# Patient Record
Sex: FEMALE
[De-identification: names, ages, dates, MRNs, and addresses within clinical notes are randomized per-mention and may not be internally consistent; named-entity substitution may affect disease eponyms.]

---

## 2023-12-12 ENCOUNTER — APPOINTMENT (OUTPATIENT)
Dept: RHEUMATOLOGY | Facility: CLINIC | Age: 53
End: 2023-12-12

## 2023-12-14 ENCOUNTER — APPOINTMENT (OUTPATIENT)
Dept: RHEUMATOLOGY | Facility: CLINIC | Age: 53
End: 2023-12-14

## 2023-12-14 PROBLEM — Z00.00 ENCOUNTER FOR PREVENTIVE HEALTH EXAMINATION: Status: ACTIVE | Noted: 2023-12-14

## 2024-09-30 ENCOUNTER — TRANSCRIPTION ENCOUNTER (OUTPATIENT)
Age: 54
End: 2024-09-30

## 2024-10-03 ENCOUNTER — APPOINTMENT (OUTPATIENT)
Dept: RHEUMATOLOGY | Facility: CLINIC | Age: 54
End: 2024-10-03
Payer: MEDICAID

## 2024-10-03 ENCOUNTER — LABORATORY RESULT (OUTPATIENT)
Age: 54
End: 2024-10-03

## 2024-10-03 VITALS
OXYGEN SATURATION: 100 % | SYSTOLIC BLOOD PRESSURE: 115 MMHG | WEIGHT: 172 LBS | HEART RATE: 82 BPM | BODY MASS INDEX: 27 KG/M2 | TEMPERATURE: 98 F | HEIGHT: 67 IN | DIASTOLIC BLOOD PRESSURE: 63 MMHG

## 2024-10-03 DIAGNOSIS — Z78.9 OTHER SPECIFIED HEALTH STATUS: ICD-10-CM

## 2024-10-03 DIAGNOSIS — Z82.49 FAMILY HISTORY OF ISCHEMIC HEART DISEASE AND OTHER DISEASES OF THE CIRCULATORY SYSTEM: ICD-10-CM

## 2024-10-03 DIAGNOSIS — R21 RASH AND OTHER NONSPECIFIC SKIN ERUPTION: ICD-10-CM

## 2024-10-03 PROCEDURE — 99204 OFFICE O/P NEW MOD 45 MIN: CPT

## 2024-10-04 DIAGNOSIS — M25.50 PAIN IN UNSPECIFIED JOINT: ICD-10-CM

## 2024-10-04 DIAGNOSIS — R76.8 OTHER SPECIFIED ABNORMAL IMMUNOLOGICAL FINDINGS IN SERUM: ICD-10-CM

## 2024-10-04 LAB
ALBUMIN SERPL ELPH-MCNC: 4.5 G/DL
ALP BLD-CCNC: 81 U/L
ALT SERPL-CCNC: 48 U/L
ANION GAP SERPL CALC-SCNC: 12 MMOL/L
AST SERPL-CCNC: 82 U/L
BILIRUB SERPL-MCNC: 0.3 MG/DL
BUN SERPL-MCNC: 7 MG/DL
CALCIUM SERPL-MCNC: 9.5 MG/DL
CHLORIDE SERPL-SCNC: 102 MMOL/L
CK SERPL-CCNC: 2202 U/L
CO2 SERPL-SCNC: 25 MMOL/L
CREAT SERPL-MCNC: 0.61 MG/DL
EGFR: 120 ML/MIN/1.73M2
GGT SERPL-CCNC: 14 U/L
GLUCOSE SERPL-MCNC: 82 MG/DL
POTASSIUM SERPL-SCNC: 4.6 MMOL/L
PROT SERPL-MCNC: 7.5 G/DL
SODIUM SERPL-SCNC: 138 MMOL/L

## 2024-10-04 NOTE — PHYSICAL EXAM
[General Appearance - Alert] : alert [General Appearance - In No Acute Distress] : in no acute distress [] : no respiratory distress [Auscultation Breath Sounds / Voice Sounds] : lungs were clear to auscultation bilaterally [Heart Rate And Rhythm] : heart rate was normal and rhythm regular [Heart Sounds] : normal S1 and S2 [Heart Sounds Gallop] : no gallops [Murmurs] : no murmurs [Heart Sounds Pericardial Friction Rub] : no pericardial rub [Abnormal Walk] : normal gait [Nail Clubbing] : no clubbing  or cyanosis of the fingernails [Musculoskeletal - Swelling] : no joint swelling seen [Motor Tone] : muscle strength and tone were normal [Impaired Insight] : insight and judgment were intact [Oriented To Time, Place, And Person] : oriented to person, place, and time [Affect] : the affect was normal [General Appearance - Well Nourished] : well nourished [General Appearance - Well-Appearing] : healthy appearing [General Appearance - Well Developed] : well developed [Sclera] : the sclera and conjunctiva were normal [Examination Of The Oral Cavity] : the lips and gums were normal [Respiration, Rhythm And Depth] : normal respiratory rhythm and effort [Exaggerated Use Of Accessory Muscles For Inspiration] : no accessory muscle use [Edema] : there was no peripheral edema [FreeTextEntry1] : hypopigmentation of PIP and MCP bilateral hands, no mechanics fingers, no digital pitting, right  ? splinter hemorrhage of the distal right finger, shine of skin of the upper chest wall (patient reports present for long time)

## 2024-10-04 NOTE — DATA REVIEWED
[FreeTextEntry1] : September 30, 2024 ANTHONY 1:80 DsdNa neg bustillos neg RNP neg ESR 11 RF neg CCP neg CPK 1184  August 17, 2024 ANTHONY- neg   October 2023 ANTHONY- neg ESR 9  RF - negative  CCP - negative  dsDNA -negative  ASO - negative   CPK -127  Uric Acid- 3.9  February 2023  ANTHONY- positive

## 2024-10-04 NOTE — PHYSICAL EXAM
[General Appearance - Alert] : alert [General Appearance - In No Acute Distress] : in no acute distress [] : no respiratory distress [Auscultation Breath Sounds / Voice Sounds] : lungs were clear to auscultation bilaterally [Heart Rate And Rhythm] : heart rate was normal and rhythm regular [Heart Sounds] : normal S1 and S2 [Heart Sounds Gallop] : no gallops [Murmurs] : no murmurs [Heart Sounds Pericardial Friction Rub] : no pericardial rub [Abnormal Walk] : normal gait [Nail Clubbing] : no clubbing  or cyanosis of the fingernails [Musculoskeletal - Swelling] : no joint swelling seen [Motor Tone] : muscle strength and tone were normal [Oriented To Time, Place, And Person] : oriented to person, place, and time [Impaired Insight] : insight and judgment were intact [Affect] : the affect was normal [General Appearance - Well Nourished] : well nourished [General Appearance - Well Developed] : well developed [General Appearance - Well-Appearing] : healthy appearing [Sclera] : the sclera and conjunctiva were normal [Examination Of The Oral Cavity] : the lips and gums were normal [Respiration, Rhythm And Depth] : normal respiratory rhythm and effort [Exaggerated Use Of Accessory Muscles For Inspiration] : no accessory muscle use [Edema] : there was no peripheral edema [FreeTextEntry1] : hypopigmentation of PIP and MCP bilateral hands, no mechanics fingers, no digital pitting, right  ? splinter hemorrhage of the distal right finger, shine of skin of the upper chest wall (patient reports present for long time)

## 2024-10-04 NOTE — HISTORY OF PRESENT ILLNESS
[FreeTextEntry1] : October 3rd, 2024 A 34 year old presents for an initial evaluation for rheumatology.  Patient visited Dr. Shetty yesterday and is here for a second opinion  Patient was evaluated at the emergency room Monday, noted to have elevated CPK Patient was seen in the ER for myalgias without muscle weakness Recently treated with antibiotics for paronychia, completed antibiotic a couple of days ago Was feeling an increase in sensitivity of the cuticles as well. Patient reports hair loss and hair thinning without bald spots  Patient reports skin discoloration (skin lightening) on the hands, particularly over the MCP and PIP  Patient reports itchy bumps bilaterally on the upper extremities Has some changes of the skin on the chest wall, but reports has been present for many years  Had an oral sore about two weeks ago, not at present Patient reports feeling weak  Patient reports dry throat symptoms, drinking more water Reports joint pain in the shoulders  Reports joint pain in the knees for years  Patient reports puffiness in the face recently Reports rash on the eyebrows  Patient reports thumb and index finger occasionally changing color in the cold (usually indoors)  Denies photosensitivity Denies SOB or chest pain Denies peripheral edema No fevers, chills, or weight loss Patient reports undergoing knee and shoulder xray last year, results reviewed on patient's phone  Patient visited [facility] 3 weeks ago, blood results reviewed on patient's phone  Denies taking any medications at the moment, no statin use No recent exercise No recent viral infection or covid 9/30 bloodwork reviewed in [facility] from the emergency room Patient reports remaining hydrated  Discussed dermatology evaluation, has appointment pending. Patient took cephalexin medication for 5 days, finished 3 days ago for finger   No family history of SLE or RA Labs reviewed  ANTHONY 1:80

## 2024-10-04 NOTE — HISTORY OF PRESENT ILLNESS
[FreeTextEntry1] : October 3rd, 2024 A 34 year old presents for an initial evaluation for rheumatology.  Patient visited Dr. Shetty yesterday and is here for a second opinion  Patient was evaluated at the emergency room Monday, noted to have elevated CPK Patient was seen in the ER for myalgias without muscle weakness Recently treated with antibiotics for paronychia, completed antibiotic a couple of days ago Was feeling an increase in sensitivity of the cuticles as well. Patient reports hair loss and hair thinning without bald spots  Patient reports skin discoloration (skin lightening) on the hands, particularly over the MCP and PIP  Patient reports itchy bumps bilaterally on the upper extremities Has some changes of the skin on the chest wall, but reports has been present for many years  Had an oral sore about two weeks ago, not at present Patient reports feeling weak  Patient reports dry throat symptoms, drinking more water Reports joint pain in the shoulders  Reports joint pain in the knees for years  Patient reports puffiness in the face recently Reports rash on the eyebrows  Patient reports thumb and index finger occasionally changing color in the cold (usually indoors)  Denies photosensitivity Denies SOB or chest pain Denies peripheral edema No fevers, chills, or weight loss Patient reports undergoing knee and shoulder xray last year, results reviewed on patient's phone  Patient visited [facility] 3 weeks ago, blood results reviewed on patient's phone  Denies taking any medications at the moment, no statin use No recent exercise No recent viral infection or covid 9/30 bloodwork reviewed in [facility] from the emergency room Patient reports remaining hydrated  Discussed dermatology evaluation, has appointment pending. Patient took cephalexin medication for 5 days, finished 3 days ago for finger   No family history of SLE or RA Labs reviewed  ANTHONY 1:80

## 2024-10-04 NOTE — REVIEW OF SYSTEMS
[Negative] : Heme/Lymph [Joint Pain] : joint pain [Itching] : itching [Muscle Weakness] : muscle weakness [FreeTextEntry4] : dry throat  [FreeTextEntry9] : knee and shoulder pain [de-identified] : itchy bumps bilaterally on the upper extremities, rash on the eyebrows, skin discoloration on the hands  [de-identified] : muscle aches, hair loss

## 2024-10-04 NOTE — ASSESSMENT
[FreeTextEntry1] : 34 year old presents for an initial evaluation for rheumatology, previously seen by Dr. Shetty yesterday and is here for a second opinion.  Patient was recently evaluated in the emergency room for myalgias and muscle heaviness9/20/2024, noted to have elevated CPK.  Further evaluation at this time, revealed ANTHONY 1:80 (has been checked over the past year (sometimes low positive, sometimes negative) with elevated CPK of 1184.  Of note, serologies negative for SLE, MCTD, and Sjogren;s. and ESR 11.  Patient also with skin changes of the PIPs and MCPs in addition to chest wall changes although reports chest wall changes have been present for many years.  No evidence of mechanics hands, but changes of the PIP and MCPs raises the question of gottren's papules. Discussed dermatology evaluation, patient has appointment pending over the next one to two weeks as well.  Patient also with nailbed sensitivity, recently treated with antibiotics for paronychia, now improved, although sensitivity persists. Patient denies family history of lupus and history of autoimmune diseases. Patient also reports arthralgias and hair thinning, fullness of the face as well as hypopigmentation around the eyebrows.  Blood results reviewed on patient's phone including August 17, 2024: ANTHONY- neg , October 2023: ANTHONY- neg, ESR 9 , RF - negative, CCP - negative, dsDNA -negative, ASO - negative , CPK -127, Uric Acid- 3.9. Denies taking any medications, alcohol use, or rigorous exercise prior to recent labs. Dermatology pending for possible skin biopsy. Labs will be updated in office including Aldolase, Centromere Ab, CMP, CK, GGT, Myomarker panel 3, Scleroderma Ab, RNA polymerase III IGG. Discussed If CPK and aldolase elevated, will refer for EMG as well. Follow up in 3-4 weeks, further management pending results.

## 2024-10-04 NOTE — ADDENDUM
[FreeTextEntry1] :  I, Nurys Mcgrath, acted solely as a scribe for Dr. Norah Chakraborty, direction and personally dictated by me on 10/03/2024.. I have reviewed the chart and agree that the record accurately reflects my personal performance of the history, physical exam, assessment, and plan. I have also personally directed, reviewed, and agreed with the chart.

## 2024-10-04 NOTE — REVIEW OF SYSTEMS
[Negative] : Heme/Lymph [Joint Pain] : joint pain [Itching] : itching [Muscle Weakness] : muscle weakness [FreeTextEntry4] : dry throat  [FreeTextEntry9] : knee and shoulder pain [de-identified] : itchy bumps bilaterally on the upper extremities, rash on the eyebrows, skin discoloration on the hands  [de-identified] : muscle aches, hair loss

## 2024-10-07 LAB — ALDOLASE SERPL-CCNC: 14.7 U/L
